# Patient Record
Sex: MALE | Race: WHITE | NOT HISPANIC OR LATINO | URBAN - METROPOLITAN AREA
[De-identification: names, ages, dates, MRNs, and addresses within clinical notes are randomized per-mention and may not be internally consistent; named-entity substitution may affect disease eponyms.]

---

## 2019-03-21 ENCOUNTER — HOSPITAL ENCOUNTER (EMERGENCY)
Facility: HOSPITAL | Age: 24
Discharge: HOME | End: 2019-03-21
Attending: EMERGENCY MEDICINE
Payer: COMMERCIAL

## 2019-03-21 VITALS
TEMPERATURE: 101 F | HEART RATE: 90 BPM | OXYGEN SATURATION: 96 % | HEIGHT: 71 IN | SYSTOLIC BLOOD PRESSURE: 132 MMHG | RESPIRATION RATE: 16 BRPM | DIASTOLIC BLOOD PRESSURE: 97 MMHG | WEIGHT: 185 LBS | BODY MASS INDEX: 25.9 KG/M2

## 2019-03-21 DIAGNOSIS — B34.9 ACUTE VIRAL SYNDROME: Primary | ICD-10-CM

## 2019-03-21 LAB
BILIRUB UR QL STRIP: NEGATIVE
CLARITY UR REFRACT.AUTO: CLEAR
COLOR UR AUTO: YELLOW
CTP QC/QA: YES
DEPRECATED S PYO AG THROAT QL EIA: NEGATIVE
GLUCOSE UR QL STRIP: NEGATIVE
HGB UR QL STRIP: NEGATIVE
KETONES UR QL STRIP: NEGATIVE
LEUKOCYTE ESTERASE UR QL STRIP: NEGATIVE
NITRITE UR QL STRIP: NEGATIVE
PH UR STRIP: 6 [PH] (ref 5–8)
POC MOLECULAR INFLUENZA A AGN: NEGATIVE
POC MOLECULAR INFLUENZA B AGN: NEGATIVE
PROT UR QL STRIP: NEGATIVE
SP GR UR STRIP: 1.02 (ref 1–1.03)
URN SPEC COLLECT METH UR: NORMAL

## 2019-03-21 PROCEDURE — 25000003 PHARM REV CODE 250: Performed by: EMERGENCY MEDICINE

## 2019-03-21 PROCEDURE — 99284 EMERGENCY DEPT VISIT MOD MDM: CPT | Mod: 25

## 2019-03-21 PROCEDURE — 87081 CULTURE SCREEN ONLY: CPT

## 2019-03-21 PROCEDURE — 87880 STREP A ASSAY W/OPTIC: CPT

## 2019-03-21 PROCEDURE — 99285 PR EMERGENCY DEPT VISIT,LEVEL V: ICD-10-PCS | Mod: ,,, | Performed by: EMERGENCY MEDICINE

## 2019-03-21 PROCEDURE — 81003 URINALYSIS AUTO W/O SCOPE: CPT

## 2019-03-21 PROCEDURE — 99285 EMERGENCY DEPT VISIT HI MDM: CPT | Mod: ,,, | Performed by: EMERGENCY MEDICINE

## 2019-03-21 PROCEDURE — 87502 INFLUENZA DNA AMP PROBE: CPT

## 2019-03-21 RX ORDER — ACETAMINOPHEN 500 MG
1000 TABLET ORAL
Status: COMPLETED | OUTPATIENT
Start: 2019-03-21 | End: 2019-03-21

## 2019-03-21 RX ADMIN — ACETAMINOPHEN 1000 MG: 500 TABLET ORAL at 09:03

## 2019-03-22 NOTE — ED NOTES
The patient finished the liter of water and asked for a second. He is having no trouble tolerating the fluids at this time. Will continue to monitor.

## 2019-03-22 NOTE — ED PROVIDER NOTES
Encounter Date: 3/21/2019    SCRIBE #1 NOTE: I, Son Luma, am scribing for, and in the presence of,  Dr. Fisher . I have scribed the entire note.       History     Chief Complaint   Patient presents with    Fever     Pt to ER c/o fever and body aches since tuesday. He states cough and congestion last week. Denies taking tylenol/ibuprofen. Only taking dayquil/nightquil at home.      Mr. Monroy is a 24 y.o. male with past medical history of seasonal allergies with a chief complaint of fever and generalized body aches. Patient states that last week he reports of flu-like symptoms. Patient endorses fever, congestion, and mild abdominal pain at that time. He took dayquil and Nyquil which gave him mild relief. He states that his symptoms was improving; however, 3 days ago his symptoms had came back. He reports of a 102.1 fever, mild dysuria, and body aches. He denies any foul smell or discoloration when he urinates. He reports of arthralgia to his back, hip, and elbow. He has not taken any tylenol today for his symptoms.       The history is provided by the patient and medical records.     Review of patient's allergies indicates:  No Known Allergies  History reviewed. No pertinent past medical history.  History reviewed. No pertinent surgical history.  History reviewed. No pertinent family history.  Social History     Tobacco Use    Smoking status: Never Smoker    Smokeless tobacco: Never Used   Substance Use Topics    Alcohol use: Yes    Drug use: No     Review of Systems   Constitutional: Positive for fever. Negative for chills.   HENT: Negative for tinnitus.    Eyes: Negative for visual disturbance.   Respiratory: Negative for shortness of breath.    Cardiovascular: Negative for chest pain.   Gastrointestinal: Negative for diarrhea, nausea and vomiting.   Genitourinary: Positive for dysuria.   Musculoskeletal: Positive for arthralgias. Negative for back pain.   Skin: Negative for rash.   Neurological: Negative  for headaches.       Physical Exam     Initial Vitals [03/21/19 2017]   BP Pulse Resp Temp SpO2   (!) 144/87 108 16 (!) 101.2 °F (38.4 °C) 97 %      MAP       --         Physical Exam    Nursing note and vitals reviewed.  Constitutional: He appears well-developed and well-nourished. He is not diaphoretic. No distress.   HENT:   Head: Normocephalic and atraumatic.   Mouth/Throat: Uvula is midline. Mucous membranes are dry. Posterior oropharyngeal erythema present.   Petechiae   Thin whiteness phlegm but no particular exudate.   Neck: Normal range of motion. Neck supple. No JVD present.   Cardiovascular: Normal rate, regular rhythm, normal heart sounds and intact distal pulses.   Pulmonary/Chest: Breath sounds normal. No respiratory distress. He has no wheezes. He has no rhonchi. He has no rales.   Abdominal: Soft. He exhibits no distension. There is no tenderness.   Musculoskeletal: Normal range of motion. He exhibits no edema.   Lymphadenopathy:     He has no cervical adenopathy.   Neurological: He is alert and oriented to person, place, and time. He has normal strength. No cranial nerve deficit or sensory deficit.   Skin: Skin is warm and dry.         ED Course   Procedures  Labs Reviewed   POCT INFLUENZA A/B MOLECULAR          Imaging Results    None       X-Rays:   Independently Interpreted Readings:   Chest X-Ray: Normal heart size.  No infiltrates.  No acute abnormalities.     Medical Decision Making:   History:   Old Medical Records: I decided to obtain old medical records.  Initial Assessment:   Urgent evaluation of 24 y.o. male here with fever and body aches.  Differential Diagnosis:   Influenza, viral syndrome, strep pharyngitis  Clinical Tests:   Lab Tests: Ordered and Reviewed  Radiological Study: Reviewed and Ordered  ED Management:  - strep swab  - influenza swab  - chest x-ray  - Tylenol  - oral hydration    Strep and influenza are negative. Chest x-ray reviewed with no infiltrate or effusion.  Patient  was treated with Tylenol.  UA is negative for infectious process.  Symptoms consistent with viral syndrome, discussed supportive treatment at home with Motrin/Tylenol as needed.  Increase oral hydration.  He expresses understanding.  Patient given strict return precautions.            Scribe Attestation:   Scribe #1: I performed the above scribed service and the documentation accurately describes the services I performed. I attest to the accuracy of the note.    Attending Attestation:           Physician Attestation for Scribe:      Comments: I, Dr. Debra Fisher, personally performed the services described in this documentation. All medical record entries made by the scribe were at my direction and in my presence.  I have reviewed the chart and agree that the record reflects my personal performance and is accurate and complete. Debra Fisher MD.                 Clinical Impression:       ICD-10-CM ICD-9-CM   1. Acute viral syndrome B34.9 079.99         Disposition:   Disposition: Discharged  Condition: Stable                        Debra Fisher MD  03/21/19 [id number]

## 2019-03-22 NOTE — ED TRIAGE NOTES
Pt presents to the ED c/o axillary temp of 102.1. Pt had oral temp of 101.2 in triage. Pt states for about a week now he has been having flu like symptoms. Pt endorses joint pain, with non productive intermittent cough. Pt states his girlfriend had the same symptoms as him but was not officially dx with the flu. Pt states he took dayquil and claritin with moderate relief. Pt denies any further complaints at this time. Pt is AAOx4. Name and  checked and verified.

## 2019-03-22 NOTE — DISCHARGE INSTRUCTIONS
- continue ibuprofen/Tylenol for fever and myalgias  - return to the emergency room if symptoms get worse  - continue oral hydration

## 2019-03-24 LAB — BACTERIA THROAT CULT: NORMAL
